# Patient Record
Sex: FEMALE | Race: BLACK OR AFRICAN AMERICAN | Employment: UNEMPLOYED | URBAN - METROPOLITAN AREA
[De-identification: names, ages, dates, MRNs, and addresses within clinical notes are randomized per-mention and may not be internally consistent; named-entity substitution may affect disease eponyms.]

---

## 2017-10-18 ENCOUNTER — OFFICE VISIT (OUTPATIENT)
Dept: INTERNAL MEDICINE CLINIC | Age: 52
End: 2017-10-18

## 2017-10-18 VITALS
RESPIRATION RATE: 20 BRPM | BODY MASS INDEX: 27.32 KG/M2 | HEIGHT: 65 IN | SYSTOLIC BLOOD PRESSURE: 147 MMHG | WEIGHT: 164 LBS | HEART RATE: 77 BPM | TEMPERATURE: 97.8 F | DIASTOLIC BLOOD PRESSURE: 96 MMHG | OXYGEN SATURATION: 100 %

## 2017-10-18 DIAGNOSIS — K21.9 GASTROESOPHAGEAL REFLUX DISEASE WITHOUT ESOPHAGITIS: ICD-10-CM

## 2017-10-18 DIAGNOSIS — Z12.31 ENCOUNTER FOR SCREENING MAMMOGRAM FOR BREAST CANCER: ICD-10-CM

## 2017-10-18 DIAGNOSIS — Z78.0 MENOPAUSE: ICD-10-CM

## 2017-10-18 DIAGNOSIS — Z13.0 SCREENING FOR DEFICIENCY ANEMIA: ICD-10-CM

## 2017-10-18 DIAGNOSIS — F32.A ANXIETY AND DEPRESSION: ICD-10-CM

## 2017-10-18 DIAGNOSIS — F41.9 ANXIETY AND DEPRESSION: ICD-10-CM

## 2017-10-18 DIAGNOSIS — Z23 ENCOUNTER FOR IMMUNIZATION: ICD-10-CM

## 2017-10-18 DIAGNOSIS — R13.10 DYSPHAGIA, UNSPECIFIED TYPE: ICD-10-CM

## 2017-10-18 DIAGNOSIS — R23.2 HOT FLASHES: ICD-10-CM

## 2017-10-18 DIAGNOSIS — F17.218 CIGARETTE NICOTINE DEPENDENCE WITH OTHER NICOTINE-INDUCED DISORDER: ICD-10-CM

## 2017-10-18 DIAGNOSIS — M65.332 TRIGGER MIDDLE FINGER OF LEFT HAND: ICD-10-CM

## 2017-10-18 DIAGNOSIS — Z13.220 SCREENING FOR HYPERLIPIDEMIA: ICD-10-CM

## 2017-10-18 DIAGNOSIS — I10 ESSENTIAL HYPERTENSION: Primary | ICD-10-CM

## 2017-10-18 RX ORDER — CITALOPRAM 20 MG/1
20 TABLET, FILM COATED ORAL DAILY
Qty: 30 TAB | Refills: 6 | Status: SHIPPED | OUTPATIENT
Start: 2017-10-18 | End: 2017-11-20 | Stop reason: SDUPTHER

## 2017-10-18 RX ORDER — AMLODIPINE BESYLATE 5 MG/1
5 TABLET ORAL DAILY
Qty: 30 TAB | Refills: 6 | Status: SHIPPED | OUTPATIENT
Start: 2017-10-18 | End: 2017-11-20 | Stop reason: SDUPTHER

## 2017-10-18 NOTE — PATIENT INSTRUCTIONS
Body Mass Index: Care Instructions  Your Care Instructions    Body mass index (BMI) can help you see if your weight is raising your risk for health problems. It uses a formula to compare how much you weigh with how tall you are. · A BMI lower than 18.5 is considered underweight. · A BMI between 18.5 and 24.9 is considered healthy. · A BMI between 25 and 29.9 is considered overweight. A BMI of 30 or higher is considered obese. If your BMI is in the normal range, it means that you have a lower risk for weight-related health problems. If your BMI is in the overweight or obese range, you may be at increased risk for weight-related health problems, such as high blood pressure, heart disease, stroke, arthritis or joint pain, and diabetes. If your BMI is in the underweight range, you may be at increased risk for health problems such as fatigue, lower protection (immunity) against illness, muscle loss, bone loss, hair loss, and hormone problems. BMI is just one measure of your risk for weight-related health problems. You may be at higher risk for health problems if you are not active, you eat an unhealthy diet, or you drink too much alcohol or use tobacco products. Follow-up care is a key part of your treatment and safety. Be sure to make and go to all appointments, and call your doctor if you are having problems. It's also a good idea to know your test results and keep a list of the medicines you take. How can you care for yourself at home? · Practice healthy eating habits. This includes eating plenty of fruits, vegetables, whole grains, lean protein, and low-fat dairy. · If your doctor recommends it, get more exercise. Walking is a good choice. Bit by bit, increase the amount you walk every day. Try for at least 30 minutes on most days of the week. · Do not smoke. Smoking can increase your risk for health problems. If you need help quitting, talk to your doctor about stop-smoking programs and medicines. These can increase your chances of quitting for good. · Limit alcohol to 2 drinks a day for men and 1 drink a day for women. Too much alcohol can cause health problems. If you have a BMI higher than 25  · Your doctor may do other tests to check your risk for weight-related health problems. This may include measuring the distance around your waist. A waist measurement of more than 40 inches in men or 35 inches in women can increase the risk of weight-related health problems. · Talk with your doctor about steps you can take to stay healthy or improve your health. You may need to make lifestyle changes to lose weight and stay healthy, such as changing your diet and getting regular exercise. If you have a BMI lower than 18.5  · Your doctor may do other tests to check your risk for health problems. · Talk with your doctor about steps you can take to stay healthy or improve your health. You may need to make lifestyle changes to gain or maintain weight and stay healthy, such as getting more healthy foods in your diet and doing exercises to build muscle. Where can you learn more? Go to http://natty-larry.info/. Enter S176 in the search box to learn more about \"Body Mass Index: Care Instructions. \"  Current as of: January 23, 2017  Content Version: 11.3  © 5402-3566 [employer]. Care instructions adapted under license by [employer] (which disclaims liability or warranty for this information). If you have questions about a medical condition or this instruction, always ask your healthcare professional. [place] [address] any warranty or liability for your use of this information. Learning About Anxiety Disorders  What are anxiety disorders? Anxiety disorders are a type of medical problem. They cause severe anxiety. When you feel anxious, you feel that something bad is about to happen. This feeling interferes with your life.   These disorders include:  · Generalized anxiety disorder. You feel worried and stressed about many everyday events and activities. This goes on for several months and disrupts your life on most days. · Panic disorder. You have repeated panic attacks. A panic attack is a sudden, intense fear or anxiety. It may make you feel short of breath. Your heart may pound. · Social anxiety disorder. You feel very anxious about what you will say or do in front of people. For example, you may be scared to talk or eat in public. This problem affects your daily life. · Phobias. You are very scared of a specific object, situation, or activity. For example, you may fear spiders, high places, or small spaces. What are the symptoms? Generalized anxiety disorder  Symptoms may include:  · Feeling worried and stressed about many things almost every day. · Feeling tired or irritable. You may have a hard time concentrating. · Having headaches or muscle aches. · Having a hard time getting to sleep or staying asleep. Panic disorder  You may have repeated panic attacks when there is no reason for feeling afraid. You may change your daily activities because you worry that you will have another attack. Symptoms may include:  · Intense fear, terror, or anxiety. · Trouble breathing or very fast breathing. · Chest pain or tightness. · A heartbeat that races or is not regular. Social anxiety disorder  Symptoms may include:  · Fear about a social situation, such as eating in front of others or speaking in public. You may worry a lot. Or you may be afraid that something bad will happen. · Anxiety that can cause you to blush, sweat, and feel shaky. · A heartbeat that is faster than normal.  · A hard time focusing. Phobias  Symptoms may include:  · More fear than most people of being around an object, being in a situation, or doing an activity. You might also be stressed about the chance of being around the thing you fear.   · Worry about losing control, panicking, fainting, or having physical symptoms like a faster heartbeat when you are around the situation or object. How are these disorders treated? Anxiety disorders can be treated with medicines or counseling. A combination of both may be used. Medicines may include:  · Antidepressants. These may help your symptoms by keeping chemicals in your brain in balance. · Benzodiazepines. These may give you short-term relief of your symptoms. Some people use cognitive-behavioral therapy. A therapist helps you learn to change stressful or bad thoughts into helpful thoughts. Lead a healthy lifestyle  A healthy lifestyle may help you feel better. · Get at least 30 minutes of exercise on most days of the week. Walking is a good choice. · Eat a healthy diet. Include fruits, vegetables, lean proteins, and whole grains in your diet each day. · Try to go to bed at the same time every night. Try for 8 hours of sleep a night. · Find ways to manage stress. Try relaxation exercises. · Avoid alcohol and illegal drugs. Follow-up care is a key part of your treatment and safety. Be sure to make and go to all appointments, and call your doctor if you are having problems. It's also a good idea to know your test results and keep a list of the medicines you take. Where can you learn more? Go to http://natty-larry.info/. Enter I026 in the search box to learn more about \"Learning About Anxiety Disorders. \"  Current as of: May 3, 2017  Content Version: 11.3  © 9632-4417 [employer], [employer]. Care instructions adapted under license by [employer] (which disclaims liability or warranty for this information). If you have questions about a medical condition or this instruction, always ask your healthcare professional. [address] any warranty or liability for your use of this information.

## 2017-10-18 NOTE — MR AVS SNAPSHOT
Visit Information Date & Time Provider Department Dept. Phone Encounter #  
 10/18/2017  3:00 PM Anjel Henao MD [facility] [address] [place] [address] [phone] [id number] Follow-up Instructions Return in about 4 weeks (around 11/15/2017) for BP check. Your Appointments 11/27/2017  2:00 PM  
Office Visit with Anjel Henao MD  
[facility] [address] [place] [address] Appt Note: 4 week f/u  
 [address] [facility], [address] [place] [address] [place]  
  
   
 [address], [address] Upcoming Health Maintenance Date Due Pneumococcal 19-64 Medium Risk (1 of 1 - PPSV23) 12/15/1986 DTaP/Tdap/Td series (1 - Tdap) 12/15/1988 PAP AKA CERVICAL CYTOLOGY 12/15/1988 INFLUENZA AGE 9 TO ADULT 8/1/2017 Allergies as of 10/18/2017  Review Complete On: 10/18/2017 By: Anjel Henao MD  
 No Known Allergies Current Immunizations  Reviewed on 10/18/2017 Name Date Influenza Vaccine (Quad) PF 10/18/2017 Reviewed by Remington Mullen LPN on 17/62/7987 at  3:55 PM  
You Were Diagnosed With   
  
 Codes Comments Essential hypertension    -  Primary ICD-10-CM: I10 
ICD-9-CM: 401.9 Screening for hyperlipidemia     ICD-10-CM: Z13.220 ICD-9-CM: V77.91 Screening for deficiency anemia     ICD-10-CM: Z13.0 ICD-9-CM: V78.1 Menopause     ICD-10-CM: Z78.0 ICD-9-CM: 627.2 Anxiety     ICD-10-CM: F41.9 ICD-9-CM: 300.00 Gastroesophageal reflux disease without esophagitis     ICD-10-CM: K21.9 ICD-9-CM: 530.81 Dysphagia, unspecified type     ICD-10-CM: R13.10 ICD-9-CM: 787.20 Hot flashes     ICD-10-CM: R23.2 ICD-9-CM: 782.62 Encounter for immunization     ICD-10-CM: G09 ICD-9-CM: V03.89 Trigger middle finger of left hand     ICD-10-CM: L19.922 ICD-9-CM: 727.03 Breast cancer screening     ICD-10-CM: Z12.31 
ICD-9-CM: V76.10 Encounter for screening mammogram for breast cancer     ICD-10-CM: Z12.31 
ICD-9-CM: V76.12 Vitals BP Pulse Temp Resp Height(growth percentile) Weight(growth percentile) (!) 147/96 77 97.8 °F (36.6 °C) 20 5' 5\" (1.651 m) 164 lb (74.4 kg) LMP SpO2 BMI OB Status Smoking Status 09/17/2015 100% 27.29 kg/m2 Postmenopausal Light Tobacco Smoker BMI and BSA Data Body Mass Index Body Surface Area  
 27.29 kg/m 2 1.85 m 2 Preferred Pharmacy Pharmacy Name Phone [address] [facility] , [address] [place] [address] [phone] Your Updated Medication List  
  
   
This list is accurate as of: 10/18/17  4:01 PM.  Always use your most recent med list. amLODIPine 5 mg tablet Commonly known as:  [place] Royals Take 1 Tab by mouth daily. citalopram 20 mg tablet Commonly known as:  Verlin Shalom Take 1 Tab by mouth daily. Prescriptions Sent to Pharmacy Refills  
 amLODIPine (NORVASC) 5 mg tablet 6 Sig: Take 1 Tab by mouth daily. Class: Normal  
 Pharmacy: [address], [facility] , [address] [place] [address] Ph #: [phone] Route: Oral  
 citalopram (CELEXA) 20 mg tablet 6 Sig: Take 1 Tab by mouth daily. Class: Normal  
 Pharmacy: [address], [facility], [address] [place] [address] Ph #: [phone] Route: Oral  
  
We Performed the Following INFLUENZA VIRUS VAC QUAD,SPLIT,PRESV FREE SYRINGE IM [id number] CPT(R)] REFERRAL TO GASTROENTEROLOGY [IPJ68 Custom] REFERRAL TO ORTHOPEDIC SURGERY [REF62 Custom] REFERRAL TO PSYCHIATRY [REF91 Custom] Comments:  
 [facility] Follow-up Instructions Return in about 4 weeks (around 11/15/2017) for BP check. To-Do List   
 10/18/2017 Lab:  CBC WITH AUTOMATED DIFF Around 10/18/2017 Lab:  HEMOGLOBIN A1C W/O EAG   
  
 10/18/2017 Lab:  LIPID PANEL   
  
 10/18/2017 Imaging:  JED MAMMO BI SCREENING INCL CAD   
  
 10/18/2017 Lab:  MICROALBUMIN, UR, RAND W/ MICROALBUMIN/CREA RATIO Around 10/18/2017 Lab:  TSH AND FREE T4 Around 12/17/2017 Lab:  METABOLIC PANEL, BASIC Referral Information Referral ID Referred By Referred To  
  
 [id number] Jelly Guzman Not Available Visits Status Start Date End Date 1 New Request 10/18/17 10/18/18 If your referral has a status of pending review or denied, additional information will be sent to support the outcome of this decision. Referral ID Referred By Referred To  
 [id number] Jelly Guzman Not Available Visits Status Start Date End Date 1 New Request 10/18/17 10/18/18 If your referral has a status of pending review or denied, additional information will be sent to support the outcome of this decision. Referral ID Referred By Referred To  
 [id number] Jelly Guzman Not Available Visits Status Start Date End Date 1 New Request 10/18/17 10/18/18 If your referral has a status of pending review or denied, additional information will be sent to support the outcome of this decision. Patient Instructions Body Mass Index: Care Instructions Your Care Instructions Body mass index (BMI) can help you see if your weight is raising your risk for health problems. It uses a formula to compare how much you weigh with how tall you are. · A BMI lower than 18.5 is considered underweight. · A BMI between 18.5 and 24.9 is considered healthy. · A BMI between 25 and 29.9 is considered overweight. A BMI of 30 or higher is considered obese. If your BMI is in the normal range, it means that you have a lower risk for weight-related health problems.  If your BMI is in the overweight or obese range, you may be at increased risk for weight-related health problems, such as high blood pressure, heart disease, stroke, arthritis or joint pain, and diabetes. If your BMI is in the underweight range, you may be at increased risk for health problems such as fatigue, lower protection (immunity) against illness, muscle loss, bone loss, hair loss, and hormone problems. BMI is just one measure of your risk for weight-related health problems. You may be at higher risk for health problems if you are not active, you eat an unhealthy diet, or you drink too much alcohol or use tobacco products. Follow-up care is a key part of your treatment and safety. Be sure to make and go to all appointments, and call your doctor if you are having problems. It's also a good idea to know your test results and keep a list of the medicines you take. How can you care for yourself at home? · Practice healthy eating habits. This includes eating plenty of fruits, vegetables, whole grains, lean protein, and low-fat dairy. · If your doctor recommends it, get more exercise. Walking is a good choice. Bit by bit, increase the amount you walk every day. Try for at least 30 minutes on most days of the week. · Do not smoke. Smoking can increase your risk for health problems. If you need help quitting, talk to your doctor about stop-smoking programs and medicines. These can increase your chances of quitting for good. · Limit alcohol to 2 drinks a day for men and 1 drink a day for women. Too much alcohol can cause health problems. If you have a BMI higher than 25 · Your doctor may do other tests to check your risk for weight-related health problems. This may include measuring the distance around your waist. A waist measurement of more than 40 inches in men or 35 inches in women can increase the risk of weight-related health problems. · Talk with your doctor about steps you can take to stay healthy or improve your health. You may need to make lifestyle changes to lose weight and stay healthy, such as changing your diet and getting regular exercise. If you have a BMI lower than 18.5 · Your doctor may do other tests to check your risk for health problems. · Talk with your doctor about steps you can take to stay healthy or improve your health. You may need to make lifestyle changes to gain or maintain weight and stay healthy, such as getting more healthy foods in your diet and doing exercises to build muscle. Where can you learn more? Go to http://natty-larry.info/. Enter S176 in the search box to learn more about \"Body Mass Index: Care Instructions. \" Current as of: January 23, 2017 Content Version: 11.3 © 2557-5177 [employer]. Care instructions adapted under license by [employer] (which disclaims liability or warranty for this information). If you have questions about a medical condition or this instruction, always ask your healthcare professional. [address] any warranty or liability for your use of this information. Learning About Anxiety Disorders What are anxiety disorders? Anxiety disorders are a type of medical problem. They cause severe anxiety. When you feel anxious, you feel that something bad is about to happen. This feeling interferes with your life. These disorders include: · Generalized anxiety disorder. You feel worried and stressed about many everyday events and activities. This goes on for several months and disrupts your life on most days. · Panic disorder. You have repeated panic attacks. A panic attack is a sudden, intense fear or anxiety. It may make you feel short of breath. Your heart may pound. · Social anxiety disorder. You feel very anxious about what you will say or do in front of people. For example, you may be scared to talk or eat in public. This problem affects your daily life. · Phobias. You are very scared of a specific object, situation, or activity. For example, you may fear spiders, high places, or small spaces. What are the symptoms? Generalized anxiety disorder Symptoms may include: · Feeling worried and stressed about many things almost every day. · Feeling tired or irritable. You may have a hard time concentrating. · Having headaches or muscle aches. · Having a hard time getting to sleep or staying asleep. Panic disorder You may have repeated panic attacks when there is no reason for feeling afraid. You may change your daily activities because you worry that you will have another attack. Symptoms may include: 
· Intense fear, terror, or anxiety. · Trouble breathing or very fast breathing. · Chest pain or tightness. · A heartbeat that races or is not regular. Social anxiety disorder Symptoms may include: · Fear about a social situation, such as eating in front of others or speaking in public. You may worry a lot. Or you may be afraid that something bad will happen. · Anxiety that can cause you to blush, sweat, and feel shaky. · A heartbeat that is faster than normal. 
· A hard time focusing. Phobias Symptoms may include: · More fear than most people of being around an object, being in a situation, or doing an activity. You might also be stressed about the chance of being around the thing you fear. · Worry about losing control, panicking, fainting, or having physical symptoms like a faster heartbeat when you are around the situation or object. How are these disorders treated? Anxiety disorders can be treated with medicines or counseling. A combination of both may be used. Medicines may include: · Antidepressants. These may help your symptoms by keeping chemicals in your brain in balance. · Benzodiazepines. These may give you short-term relief of your symptoms. Some people use cognitive-behavioral therapy. A therapist helps you learn to change stressful or bad thoughts into helpful thoughts. Lead a healthy lifestyle A healthy lifestyle may help you feel better. · Get at least 30 minutes of exercise on most days of the week. Walking is a good choice. · Eat a healthy diet. Include fruits, vegetables, lean proteins, and whole grains in your diet each day. · Try to go to bed at the same time every night. Try for 8 hours of sleep a night. · Find ways to manage stress. Try relaxation exercises. · Avoid alcohol and illegal drugs. Follow-up care is a key part of your treatment and safety. Be sure to make and go to all appointments, and call your doctor if you are having problems. It's also a good idea to know your test results and keep a list of the medicines you take. Where can you learn more? Go to http://natty-larry.info/. Enter F159 in the search box to learn more about \"Learning About Anxiety Disorders. \" Current as of: May 3, 2017 Content Version: 11.3 © 0879-5478 [employer]. Care instructions adapted under license by [employer] (which disclaims liability or warranty for this information). If you have questions about a medical condition or this instruction, always ask your healthcare professional. [place] [address] any warranty or liability for your use of this information. Introducing [facility]! Elsi Yoon introduces [employer] patient portal. Now you can access parts of your medical record, email your doctor's office, and request medication refills online. 1. In your internet browser, go to [email or web address] 2. Click on the First Time User? Click Here link in the Sign In box. You will see the New Member Sign Up page. 3. Enter your [employer] Access Code exactly as it appears below. You will not need to use this code after youve completed the sign-up process. If you do not sign up before the expiration date, you must request a new code. · [employer] Access Code: [email or web address] Expires: 1/16/2018  2:41 PM 
 
 4. Enter the last four digits of your Social Security Number (xxxx) and Date of Birth (mm/dd/yyyy) as indicated and click Submit. You will be taken to the next sign-up page. 5. Create a Resermap ID. This will be your Resermap login ID and cannot be changed, so think of one that is secure and easy to remember. 6. Create a Resermap password. You can change your password at any time. 7. Enter your Password Reset Question and Answer. This can be used at a later time if you forget your password. 8. Enter your e-mail address. You will receive e-mail notification when new information is available in [address] 9. Click Sign Up. You can now view and download portions of your medical record. 10. Click the Download Summary menu link to download a portable copy of your medical information. If you have questions, please visit the Frequently Asked Questions section of the Resermap website. Remember, Resermap is NOT to be used for urgent needs. For medical emergencies, dial 911. Now available from your iPhone and Android! Please provide this summary of care documentation to your next provider. Your primary care clinician is listed as Padmaja Cantu. If you have any questions after today's visit, please call [phone].

## 2017-10-18 NOTE — PROGRESS NOTES
Lila Gupta is a 54 y.o. female who presents for routine immunizations. She denies any symptoms , reactions or allergies that would exclude them from being immunized today. Risks and adverse reactions were discussed and the VIS was given to them. All questions were addressed. She was observed for 10 min post injection. There were no reactions observed.     Lilli Muir LPN

## 2017-10-18 NOTE — PROGRESS NOTES
INTERNISTS OF [facility]:  10/29/2017, MRN: [id number]      Caryle Canal is a 54 y.o. female and presents to clinic to Abimael Witt Real; Menopause; and Immunization/Injection (flu shot)    Subjective: The patient is a 71-year-old [place] American female with history of menopause, Mood Disorder NOS, hypertension, dysphasia, trigger finger, and stress incontinence. 1. HTN: This is a chronic condition, present for at least 6 months. The patient ran out of her medication 6 months ago. In the past, prior to running out she was taking HCTZ. She reports no adverse side effects of taking this medication. She admits that she has been distracted, taking care of her sick mom. She has not focus on her own health for quite some time. She does have a history of snoring. She is not consume energy drinks. She has 1 2 cups of coffee per day. She states she has never been on any other medication other than HCTZ for blood pressure reduction. 2.  Nicotine dependence: The patient has a history of smoking since 1998. One pack will last up to 1 week. She has never tried medication to stop smoking. Smoking helps to relax her. 3.  GERD and dysphasia: Patient has an over six-month history of GERD. She has been taking Prilosec which has worked very well in the past.  The patient has corresponding dysphasia for several months. If she eats too heavy of a meal, she will throw up typically at night. She states she has never had an endoscopy. She thinks she has lactose intolerance, throwing up after consuming any dairy products. She has chronic constipation and administers enemas on average once a month for severe symptoms. She occasionally will see some flecks of blood in her stool after enemas. She often feels full and bloated. She has no melena. She had abdominal pain is off and on and generalized. Pain is more prevalent along her epigastric area though.   She was told in the past, she may have a cyst along her left ovary. 4.  Left hand trigger finger: The patient has pain along her left hand third and fourth digits. She states that at times, such fingers will lock in place and she has to manually stretch them out. Such episodes are associated with discomfort. No alleviating factors are known. No aggravating factors are known. Symptoms have been present for at least several months. 5.  Anxiety, Depression, Fatigue, Delusional Thinking, and Menopause: The patient is a disabled . She was followed by a psychiatrist in the past.  Her previous PCP has been treating her history of anxiety and depression. She has never been hospitalized for mood disorders. Symptoms have been present since 2007. She does have a history of a suicidal attempt/ideation. Last time she felt this way was years ago. She is presently on no medication. She used to take citalopram which worked well to control symptoms. She reports no adverse side effects to taking this medication. She is asking for a refill on this medication. She does not remember the dose of citalopram she used to be on. The patient states that her symptoms of anxiety and depression stem from her history of body odor problems. Despite taking prescription strength antiperspirants, the patient reports worsening body odor. Her body odor has affected her relationships with other people. When asked how others view her body odor, the patient states that although people have never spoken to her about her body odor, she can tell with her thinking based off of her facial expressions. Furthermore, when asked if she has body odor today, she states she does not. She is using vinegar, baking soda, and lemon juice along bilateral axilla for body odor. Asked if she has any visual hallucinations, she denies them. She endorses auditory hallucinations. She states the voices tell her that she has bad body odor. She also endorses feelings of extreme fatigue.   The patient reports no history of abnormal Pap smears. She is concerned that her mood symptoms are related to menopause. She states she has not had a period at least a year. No hematuria. No vaginal bleeding. She has hot flashes. No alleviating factors are known. No aggravating factors are known. Patient Active Problem List    Diagnosis Date Noted    Gastroesophageal reflux disease without esophagitis 10/29/2017    Dysphagia 10/29/2017    Trigger middle finger of left hand 10/29/2017    Menopause 10/18/2017    Anxiety 10/18/2017    Essential hypertension 10/18/2017    Chronic vaginitis 10/30/2016    JANET (stress urinary incontinence, female) 10/30/2016           No Known Allergies    Past Medical History:   Diagnosis Date    Acid reflux     Anxiety     Depression     chroniuc anxiety'    Ectopic pregnancy     Foul smelling urine     Frequency of urination     GERD (gastroesophageal reflux disease)     HTN (hypertension)     Hypertension        Past Surgical History:   Procedure Laterality Date    HX GYN      CHGA-Removal of fallopian tube     HX OTHER SURGICAL      ectopic       Family History   Problem Relation Age of Onset    Diabetes Mother     Heart Disease Mother     Hypertension Mother     Kidney Disease Mother        Social History   Substance Use Topics    Smoking status: Light Tobacco Smoker    Smokeless tobacco: Never Used    Alcohol use No       ROS   Review of Systems   Constitutional: Positive for chills and malaise/fatigue. Negative for fever. HENT: Negative for ear pain and sore throat. Eyes: Negative for blurred vision and pain. Respiratory: Negative for cough and shortness of breath. Cardiovascular: Negative for chest pain. Gastrointestinal: Positive for abdominal pain, constipation, nausea and vomiting. Negative for melena. Genitourinary: Negative for dysuria and hematuria. Musculoskeletal: Positive for joint pain. Negative for myalgias.    Skin: Negative for rash.   Neurological: Negative for tingling, focal weakness and headaches. Endo/Heme/Allergies: Does not bruise/bleed easily. Psychiatric/Behavioral: Positive for depression and hallucinations. Negative for substance abuse and suicidal ideas. The patient is nervous/anxious. Objective     Vitals:    10/18/17 1509   BP: (!) 147/96   Pulse: 77   Resp: 20   Temp: 97.8 °F (36.6 °C)   SpO2: 100%   Weight: 164 lb (74.4 kg)   Height: 5' 5\" (1.651 m)   PainSc:   6   PainLoc: Hip   LMP: 09/17/2015       Physical Exam   Constitutional: She is oriented to person, place, and time and well-developed, well-nourished, and in no distress. HENT:   Head: Normocephalic and atraumatic. Right Ear: External ear normal.   Left Ear: External ear normal.   Nose: Nose normal.   Mouth/Throat: Oropharynx is clear and moist. No oropharyngeal exudate. Clear TMs   Eyes: Conjunctivae and EOM are normal. Pupils are equal, round, and reactive to light. Right eye exhibits no discharge. Left eye exhibits no discharge. No scleral icterus. Neck: Neck supple. No thyromegaly present. Cardiovascular: Normal rate, regular rhythm, normal heart sounds and intact distal pulses. Exam reveals no gallop and no friction rub. No murmur heard. Pulmonary/Chest: Effort normal and breath sounds normal. No respiratory distress. She has no wheezes. She has no rales. Abdominal: Soft. Bowel sounds are normal. She exhibits no distension. There is no tenderness. There is no rebound and no guarding. No hepatomegaly   Musculoskeletal: She exhibits no edema or tenderness (BUE are NTTP). Lymphadenopathy:     She has no cervical adenopathy. Neurological: She is alert and oriented to person, place, and time. She exhibits normal muscle tone. Gait normal.   Skin: Skin is warm and dry. No erythema. Psychiatric:   Slightly anxious affect   Nursing note and vitals reviewed.       LABS   Data Review:   Lab Results   Component Value Date/Time    WBC 5.2 10/18/2017 04:08 AM    HGB 12.2 10/18/2017 04:08 AM    HCT 36.3 10/18/2017 04:08 AM    PLATELET 820 88/33/0541 04:08 AM    MCV 88 10/18/2017 04:08 AM       Lab Results   Component Value Date/Time    Sodium 141 10/18/2017 04:08 AM    Potassium 4.2 10/18/2017 04:08 AM    Chloride 98 10/18/2017 04:08 AM    CO2 29 10/18/2017 04:08 AM    Glucose 99 10/18/2017 04:08 AM    BUN 16 10/18/2017 04:08 AM    Creatinine 0.76 10/18/2017 04:08 AM    BUN/Creatinine ratio 21 10/18/2017 04:08 AM    GFR est  10/18/2017 04:08 AM    GFR est non-AA 92 10/18/2017 04:08 AM    Calcium 10.7 10/18/2017 04:08 AM       Lab Results   Component Value Date/Time    Cholesterol, total 220 10/18/2017 04:08 AM    HDL Cholesterol 112 10/18/2017 04:08 AM    LDL, calculated 88 10/18/2017 04:08 AM    VLDL, calculated 20 10/18/2017 04:08 AM    Triglyceride 99 10/18/2017 04:08 AM       Lab Results   Component Value Date/Time    Hemoglobin A1c 5.6 10/18/2017 04:08 AM       Assessment/Plan:   1. Menopause and Depression/Anxiety Disorder NOS: The pt does not have any bad body odor today. Delusional thinking? Ordering citalopram.  The patient was instructed to stop this medication in the event that her symptoms worsen. Requesting records from her previous PCP. A referral was placed to the psychiatry team.  The patient was encouraged to schedule an appointment for management of her underlying mood disorder NOS. Checking TFTs. ORDERS:  - TSH AND FREE T4; Future  - REFERRAL TO PSYCHIATRY  - citalopram (CELEXA) 20 mg tablet; Take 1 Tab by mouth daily. Dispense: 30 Tab; Refill: 6    2. Essential hypertension: BP is elevated but the pt ran out of her rx. Must r/o nephropathy. Checking a lipid panel, BMP, TFTs, A1c, and a urine protein screen. Ordering amlodipine 5 mg daily. May consider getting a sleep study to rule out underlying OVIDIO in the future    ORDERS:  - LIPID PANEL;  Future  - METABOLIC PANEL, BASIC; Future  - TSH AND FREE T4; Future  - MICROALBUMIN, UR, RAND W/ MICROALBUMIN/CREA RATIO; Future  - amLODIPine (NORVASC) 5 mg tablet; Take 1 Tab by mouth daily. Dispense: 30 Tab; Refill: 6  - HEMOGLOBIN A1C W/O EAG; Future    3. Gastroesophageal reflux disease without esophagitis and N/V:   Placing a referral to gastroenterology team.  Can take an over-the-counter PPI. ORDERS:  - REFERRAL TO GASTROENTEROLOGY    4. Health Maintenance:  The flu vaccine was administered today. Ordering a mammogram to screen for breast cancer  Ordering a CBC to screen for anemia. Ordering a lipid panel to screen for hyperlipidemia. I requesting her last mammogram, Pap smear, vaccination record, and PCP records. ORDERS:  - LIPID PANEL; Future  - Influenza virus vaccine (QUADRIVALENT PRES FREE SYRINGE) IM (18038)  - JED MAMMO BI SCREENING INCL CAD; Future  - CBC WITH AUTOMATED DIFF; Future    5. Trigger middle finger of left hand  Placing a referral to orthopedic surgery    ORDERS:  - REFERRAL TO ORTHOPEDIC SURGERY    6.  Nicotine dependence:  I encouraged patient to stop smoking. We briefly discussed the potential benefits of smoking cessation. Health Maintenance Due   Topic Date Due    Pneumococcal 19-64 Medium Risk (1 of 1 - PPSV23) 12/15/1986    DTaP/Tdap/Td series (1 - Tdap) 12/15/1988    PAP AKA CERVICAL CYTOLOGY  12/15/1988         Lab review: orders written for new lab studies as appropriate; see orders    I have discussed the diagnosis with the patient and the intended plan as seen in the above orders. The patient has received an after-visit summary and questions were answered concerning future plans. I have discussed medication side effects and warnings with the patient as well. I have reviewed the plan of care with the patient, accepted their input and they are in agreement with the treatment goals. All questions were answered. The patient understands the plan of care. Handouts provided today with above information.  Pt instructed if symptoms worsen to call the office or report to the ED for continued care. Greater than 50% of the visit time was spent in counseling and/or coordination of care. Follow-up Disposition:  Return in about 4 weeks (around 11/15/2017) for BP check.     Leti Rosario MD

## 2017-10-19 LAB
ALBUMIN/CREAT UR: ABNORMAL MG/G CREAT (ref 0–30)
BASOPHILS # BLD AUTO: 0 X10E3/UL (ref 0–0.2)
BASOPHILS NFR BLD AUTO: 0 %
BUN SERPL-MCNC: 16 MG/DL (ref 6–24)
BUN/CREAT SERPL: 21 (ref 9–23)
CALCIUM SERPL-MCNC: 10.7 MG/DL (ref 8.7–10.2)
CHLORIDE SERPL-SCNC: 98 MMOL/L (ref 96–106)
CHOLEST SERPL-MCNC: 220 MG/DL (ref 100–199)
CO2 SERPL-SCNC: 29 MMOL/L (ref 18–29)
CREAT SERPL-MCNC: 0.76 MG/DL (ref 0.57–1)
CREAT UR-MCNC: 58.3 MG/DL
EOSINOPHIL # BLD AUTO: 0.1 X10E3/UL (ref 0–0.4)
EOSINOPHIL NFR BLD AUTO: 2 %
ERYTHROCYTE [DISTWIDTH] IN BLOOD BY AUTOMATED COUNT: 13.5 % (ref 12.3–15.4)
GFR SERPLBLD CREATININE-BSD FMLA CKD-EPI: 107 ML/MIN/1.73
GFR SERPLBLD CREATININE-BSD FMLA CKD-EPI: 92 ML/MIN/1.73
GLUCOSE SERPL-MCNC: 99 MG/DL (ref 65–99)
HBA1C MFR BLD: 5.6 % (ref 4.8–5.6)
HCT VFR BLD AUTO: 36.3 % (ref 34–46.6)
HDLC SERPL-MCNC: 112 MG/DL
HGB BLD-MCNC: 12.2 G/DL (ref 11.1–15.9)
IMM GRANULOCYTES # BLD: 0 X10E3/UL (ref 0–0.1)
IMM GRANULOCYTES NFR BLD: 0 %
INTERPRETATION, 910389: NORMAL
LDLC SERPL CALC-MCNC: 88 MG/DL (ref 0–99)
LYMPHOCYTES # BLD AUTO: 2.4 X10E3/UL (ref 0.7–3.1)
LYMPHOCYTES NFR BLD AUTO: 46 %
MCH RBC QN AUTO: 29.4 PG (ref 26.6–33)
MCHC RBC AUTO-ENTMCNC: 33.6 G/DL (ref 31.5–35.7)
MCV RBC AUTO: 88 FL (ref 79–97)
MICROALBUMIN UR-MCNC: <3 UG/ML
MONOCYTES # BLD AUTO: 0.3 X10E3/UL (ref 0.1–0.9)
MONOCYTES NFR BLD AUTO: 6 %
NEUTROPHILS # BLD AUTO: 2.4 X10E3/UL (ref 1.4–7)
NEUTROPHILS NFR BLD AUTO: 46 %
PLATELET # BLD AUTO: 263 X10E3/UL (ref 150–379)
POTASSIUM SERPL-SCNC: 4.2 MMOL/L (ref 3.5–5.2)
RBC # BLD AUTO: 4.15 X10E6/UL (ref 3.77–5.28)
SODIUM SERPL-SCNC: 141 MMOL/L (ref 134–144)
T4 FREE SERPL-MCNC: 0.83 NG/DL (ref 0.82–1.77)
TRIGL SERPL-MCNC: 99 MG/DL (ref 0–149)
TSH SERPL DL<=0.005 MIU/L-ACNC: 0.32 UIU/ML (ref 0.45–4.5)
VLDLC SERPL CALC-MCNC: 20 MG/DL (ref 5–40)
WBC # BLD AUTO: 5.2 X10E3/UL (ref 3.4–10.8)

## 2017-10-20 ENCOUNTER — TELEPHONE (OUTPATIENT)
Dept: INTERNAL MEDICINE CLINIC | Age: 52
End: 2017-10-20

## 2017-10-20 NOTE — TELEPHONE ENCOUNTER
Message left for patient that she needs to change her pcp with [facility] insurance before I can do any referrals for her

## 2017-10-25 ENCOUNTER — TELEPHONE (OUTPATIENT)
Dept: INTERNAL MEDICINE CLINIC | Age: 52
End: 2017-10-25

## 2017-10-25 DIAGNOSIS — R79.89 ABNORMAL TSH: ICD-10-CM

## 2017-10-25 DIAGNOSIS — E23.0 HYPOPITUITARISM (HCC): Primary | ICD-10-CM

## 2017-10-25 DIAGNOSIS — R79.89 ABNORMAL TSH: Primary | ICD-10-CM

## 2017-10-25 NOTE — TELEPHONE ENCOUNTER
I was unsuccessful with reaching the pt via phone. Her fT4 is low normal but her TSH is very low. I am concerned that she is not producing enough TSH - produced by her brain and as a result is barely making enough thyroid hormone. I am ordering a head MRI to evaluate her pituitary gland and placing a referral to the Endocrinology team for assistance. I left a message to phone us back to discuss the lab results, referral, and brain MRI.     Dr. Cochran Likes  [facility] of [address]  Phone: [phone]  Fax: [phone]

## 2017-10-25 NOTE — PROGRESS NOTES
Her 10 year CVD risk is 3.1%. Statin rx is thus not warranted at this time. There is no evidence of diabetes. CBC results are normal. Kidney function is normal. (Please see telephone encounter note regarding her TFTs and the plan for further evaluation).     Dr. Lexx Marie  [facility] of [address]  Phone: [phone]  Fax: [phone]

## 2017-10-26 NOTE — TELEPHONE ENCOUNTER
Spoke with patient, given results message below and verbalized understanding, she will get MRI scheduled. Dr. Man Sewell, pt asked about her UA results, stated that her urine has a smell and she had talked to you about that and you were getting a UA with her labs, but I only see a Microalbumin Ur completed. Pt is also stating that she really needs a prescription strength antiperspirant and that she has tried everything otc. Her mother passed away on 10/20/17 as well, she had been caring for her. Pt is requesting that you please call her back today and let her know what to do regarding her urinary symptoms and also about the antiperspirant, she wants to speak with you directly. Her number is [phone].

## 2017-10-27 ENCOUNTER — TELEPHONE (OUTPATIENT)
Dept: INTERNAL MEDICINE CLINIC | Age: 52
End: 2017-10-27

## 2017-10-27 RX ORDER — LORAZEPAM 0.5 MG/1
TABLET ORAL
Qty: 3 TAB | Refills: 0 | Status: SHIPPED | OUTPATIENT
Start: 2017-10-27 | End: 2017-11-09 | Stop reason: ALTCHOICE

## 2017-10-27 NOTE — TELEPHONE ENCOUNTER
I answered all of her questions via phone today. She lost her mother. I offered my condolences on her loss. She is not yet scheduled with Psychiatry and Endocrinology. I encouraged her to schedule these apts. I also encouraged the pt to get her MRI (brain). She has anxiety about this. Ativan was ordered just for the MRI study. Pt states she will pick this rx up today.          Dr. Mason Mosquera  [facility], [address] [place] [address] Str.  Phone: [phone]  Fax: [phone]

## 2017-10-29 PROBLEM — R13.10 DYSPHAGIA: Status: ACTIVE | Noted: 2017-10-29

## 2017-10-29 PROBLEM — F32.A ANXIETY AND DEPRESSION: Status: ACTIVE | Noted: 2017-10-18

## 2017-10-29 PROBLEM — M65.332 TRIGGER MIDDLE FINGER OF LEFT HAND: Status: ACTIVE | Noted: 2017-10-29

## 2017-10-29 PROBLEM — F17.200 NICOTINE DEPENDENCE: Status: ACTIVE | Noted: 2017-10-29

## 2017-10-29 PROBLEM — K21.9 GASTROESOPHAGEAL REFLUX DISEASE WITHOUT ESOPHAGITIS: Status: ACTIVE | Noted: 2017-10-29

## 2017-11-01 ENCOUNTER — TELEPHONE (OUTPATIENT)
Dept: INTERNAL MEDICINE CLINIC | Age: 52
End: 2017-11-01

## 2017-11-01 ENCOUNTER — HOSPITAL ENCOUNTER (OUTPATIENT)
Dept: MAMMOGRAPHY | Age: 52
Discharge: HOME | End: 2017-11-01
Attending: INTERNAL MEDICINE
Payer: MEDICARE

## 2017-11-01 DIAGNOSIS — Z12.31 ENCOUNTER FOR SCREENING MAMMOGRAM FOR BREAST CANCER: ICD-10-CM

## 2017-11-01 PROCEDURE — 77063 BREAST TOMOSYNTHESIS BI: CPT

## 2017-11-08 ENCOUNTER — TELEPHONE (OUTPATIENT)
Dept: INTERNAL MEDICINE CLINIC | Age: 52
End: 2017-11-08

## 2017-11-08 DIAGNOSIS — R92.8 ABNORMALITY OF LEFT BREAST ON SCREENING MAMMOGRAM: Primary | ICD-10-CM

## 2017-11-08 NOTE — TELEPHONE ENCOUNTER
Please let the pt know that her mammogram shows an asymmetric area along her left breast. The Radiologist is requesting more pictures with another mammogram study with ultrasound technology in order to determine if the area is suspicious or not for breast cancer. I thus ordered this recommended study. She should schedule this study.      Dr. Lele Aragon  [facility] of [address]  Phone: [phone]  Fax: [phone]

## 2017-11-09 ENCOUNTER — OFFICE VISIT (OUTPATIENT)
Dept: INTERNAL MEDICINE CLINIC | Age: 52
End: 2017-11-09

## 2017-11-09 VITALS
DIASTOLIC BLOOD PRESSURE: 88 MMHG | WEIGHT: 166.6 LBS | HEIGHT: 65 IN | TEMPERATURE: 97.7 F | HEART RATE: 68 BPM | OXYGEN SATURATION: 98 % | BODY MASS INDEX: 27.76 KG/M2 | RESPIRATION RATE: 16 BRPM | SYSTOLIC BLOOD PRESSURE: 123 MMHG

## 2017-11-09 DIAGNOSIS — F41.9 ANXIETY AND DEPRESSION: Primary | ICD-10-CM

## 2017-11-09 DIAGNOSIS — R79.89 ABNORMAL TSH: ICD-10-CM

## 2017-11-09 DIAGNOSIS — I10 ESSENTIAL HYPERTENSION: ICD-10-CM

## 2017-11-09 DIAGNOSIS — F32.A ANXIETY AND DEPRESSION: Primary | ICD-10-CM

## 2017-11-09 DIAGNOSIS — R82.90 FOUL SMELLING URINE: ICD-10-CM

## 2017-11-09 DIAGNOSIS — E83.52 HYPERCALCEMIA: ICD-10-CM

## 2017-11-09 DIAGNOSIS — F17.218 CIGARETTE NICOTINE DEPENDENCE WITH OTHER NICOTINE-INDUCED DISORDER: ICD-10-CM

## 2017-11-09 RX ORDER — AZITHROMYCIN 500 MG/1
TABLET, FILM COATED ORAL
COMMUNITY
Start: 2017-08-05 | End: 2017-11-09 | Stop reason: ALTCHOICE

## 2017-11-09 RX ORDER — METRONIDAZOLE 500 MG/1
TABLET ORAL
COMMUNITY
Start: 2017-09-18 | End: 2017-11-09 | Stop reason: ALTCHOICE

## 2017-11-09 RX ORDER — CIPROFLOXACIN 500 MG/1
TABLET ORAL
COMMUNITY
Start: 2017-08-05 | End: 2017-11-09 | Stop reason: ALTCHOICE

## 2017-11-09 NOTE — PATIENT INSTRUCTIONS
Body Mass Index: Care Instructions  Your Care Instructions    Body mass index (BMI) can help you see if your weight is raising your risk for health problems. It uses a formula to compare how much you weigh with how tall you are. · A BMI lower than 18.5 is considered underweight. · A BMI between 18.5 and 24.9 is considered healthy. · A BMI between 25 and 29.9 is considered overweight. A BMI of 30 or higher is considered obese. If your BMI is in the normal range, it means that you have a lower risk for weight-related health problems. If your BMI is in the overweight or obese range, you may be at increased risk for weight-related health problems, such as high blood pressure, heart disease, stroke, arthritis or joint pain, and diabetes. If your BMI is in the underweight range, you may be at increased risk for health problems such as fatigue, lower protection (immunity) against illness, muscle loss, bone loss, hair loss, and hormone problems. BMI is just one measure of your risk for weight-related health problems. You may be at higher risk for health problems if you are not active, you eat an unhealthy diet, or you drink too much alcohol or use tobacco products. Follow-up care is a key part of your treatment and safety. Be sure to make and go to all appointments, and call your doctor if you are having problems. It's also a good idea to know your test results and keep a list of the medicines you take. How can you care for yourself at home? · Practice healthy eating habits. This includes eating plenty of fruits, vegetables, whole grains, lean protein, and low-fat dairy. · If your doctor recommends it, get more exercise. Walking is a good choice. Bit by bit, increase the amount you walk every day. Try for at least 30 minutes on most days of the week. · Do not smoke. Smoking can increase your risk for health problems. If you need help quitting, talk to your doctor about stop-smoking programs and medicines. These can increase your chances of quitting for good. · Limit alcohol to 2 drinks a day for men and 1 drink a day for women. Too much alcohol can cause health problems. If you have a BMI higher than 25  · Your doctor may do other tests to check your risk for weight-related health problems. This may include measuring the distance around your waist. A waist measurement of more than 40 inches in men or 35 inches in women can increase the risk of weight-related health problems. · Talk with your doctor about steps you can take to stay healthy or improve your health. You may need to make lifestyle changes to lose weight and stay healthy, such as changing your diet and getting regular exercise. If you have a BMI lower than 18.5  · Your doctor may do other tests to check your risk for health problems. · Talk with your doctor about steps you can take to stay healthy or improve your health. You may need to make lifestyle changes to gain or maintain weight and stay healthy, such as getting more healthy foods in your diet and doing exercises to build muscle. Where can you learn more? Go to http://natty-larry.info/. Enter S176 in the search box to learn more about \"Body Mass Index: Care Instructions. \"  Current as of: October 13, 2016  Content Version: 11.4  © 4862-3780 [employer]. Care instructions adapted under license by [employer] (which disclaims liability or warranty for this information). If you have questions about a medical condition or this instruction, always ask your healthcare professional. [place] [address] any warranty or liability for your use of this information.     Patient refused a copy of the Advanced Directive form and understands to bring it in once completed  Health Maintenance Due   Topic Date Due    Pneumococcal 19-64 Medium Risk (1 of 1 - PPSV23) 12/15/1986    DTaP/Tdap/Td series (1 - Tdap) 12/15/1988    PAP AKA CERVICAL CYTOLOGY 12/15/1988

## 2017-11-09 NOTE — MR AVS SNAPSHOT
Visit Information Date & Time Provider Department Dept. Phone Encounter #  
 11/9/2017  3:00 PM Kait Painter MD [facility] [address] [place] [address] [phone] [id number] Your Appointments 11/14/2017  8:30 AM  
New Patient with Millicent Jackson MD  
[facility], [address] and [facility] ([facility]) Appt Note: Trigger middle finger of left hand/ REF BY TREMAINE LITTLEJOHN/ *ADVISED PT TO COME EARLY W. PHOTO ID & INS. CARD, CURRENT MEDICATION LIST & DOSAGE [facility] [place] [address], [address] 200 [facility]  
[phone] [address] [place] [address] 98, [address]  
  
    
 11/27/2017  2:00 PM  
Office Visit with Kait Painter MD  
[facility] of [address] [place] [address]) Appt Note: 4 week f/u  
 [address] [facility], [address] [facility] [address]  
  
   
 [address], [address] Upcoming Health Maintenance Date Due Pneumococcal 19-64 Medium Risk (1 of 1 - PPSV23) 12/15/1986 DTaP/Tdap/Td series (1 - Tdap) 12/15/1988 PAP AKA CERVICAL CYTOLOGY 12/15/1988 Allergies as of 11/9/2017  Review Complete On: 11/9/2017 By: Dav Care No Known Allergies Current Immunizations  Reviewed on 10/18/2017 Name Date Influenza Vaccine (Quad) PF 10/18/2017 Not reviewed this visit You Were Diagnosed With   
  
 Codes Comments Cigarette nicotine dependence with other nicotine-induced disorder    -  Primary ICD-10-CM: F17.218 ICD-9-CM: 292.89 Anxiety and depression     ICD-10-CM: F41.8 ICD-9-CM: 300.00, 311 Essential hypertension     ICD-10-CM: I10 
ICD-9-CM: 401.9 Abnormal TSH     ICD-10-CM: R94.6 ICD-9-CM: 790.6 Hypercalcemia     ICD-10-CM: N23.43 
ICD-9-CM: 275.42 Vitals BP Pulse Temp Resp Height(growth percentile) Weight(growth percentile) 123/88 (BP 1 Location: Left arm, BP Patient Position: Sitting) 68 97.7 °F (36.5 °C) (Oral) 16 5' 5\" (1.651 m) 166 lb 9.6 oz (75.6 kg) LMP SpO2 BMI OB Status Smoking Status 09/17/2015 98% 27.72 kg/m2 Postmenopausal Light Tobacco Smoker BMI and BSA Data Body Mass Index Body Surface Area  
 27.72 kg/m 2 1.86 m 2 Preferred Pharmacy Pharmacy Name Phone [address] [facility], [address] [place] [address] Rd [phone] Your Updated Medication List  
  
   
This list is accurate as of: 11/9/17  3:54 PM.  Always use your most recent med list. amLODIPine 5 mg tablet Commonly known as:  [place] Take 1 Tab by mouth daily. citalopram 20 mg tablet Commonly known as:  Verlin Shalom Take 1 Tab by mouth daily. To-Do List   
 11/13/2017 1:30 PM  
  Appointment with [facility] 1 at [address] [place] [address] ([phone]) OUTSIDE FILMS  - Any outside films related to the study being scheduled should be brought with you on the day of the exam.  If this cannot be done there may be a delay in the reading of the study. MEDICATIONS  - Patient must bring a complete list of all medications currently taking to include prescriptions, over-the-counter meds, herbals, vitamins & any dietary supplements  GENERAL INSTRUCTIONS  - On the day of your exam do not use any bath powder, deodorant or lotions on the armpit area. 11/13/2017 2:30 PM  
  Appointment with [facility] 1 at [address] [place] [address] ([phone]) OUTSIDE FILMS  - Any outside films related to the study being scheduled should be brought with you on the day of the exam.  If this cannot be done there may be a delay in the reading of the study. MEDICATIONS  - Patient must bring a complete list of all medications currently taking to include prescriptions, over-the-counter meds, herbals, vitamins & any dietary supplements Patient Instructions Body Mass Index: Care Instructions Your Care Instructions Body mass index (BMI) can help you see if your weight is raising your risk for health problems. It uses a formula to compare how much you weigh with how tall you are. · A BMI lower than 18.5 is considered underweight. · A BMI between 18.5 and 24.9 is considered healthy. · A BMI between 25 and 29.9 is considered overweight. A BMI of 30 or higher is considered obese. If your BMI is in the normal range, it means that you have a lower risk for weight-related health problems. If your BMI is in the overweight or obese range, you may be at increased risk for weight-related health problems, such as high blood pressure, heart disease, stroke, arthritis or joint pain, and diabetes. If your BMI is in the underweight range, you may be at increased risk for health problems such as fatigue, lower protection (immunity) against illness, muscle loss, bone loss, hair loss, and hormone problems. BMI is just one measure of your risk for weight-related health problems. You may be at higher risk for health problems if you are not active, you eat an unhealthy diet, or you drink too much alcohol or use tobacco products. Follow-up care is a key part of your treatment and safety. Be sure to make and go to all appointments, and call your doctor if you are having problems. It's also a good idea to know your test results and keep a list of the medicines you take. How can you care for yourself at home? · Practice healthy eating habits. This includes eating plenty of fruits, vegetables, whole grains, lean protein, and low-fat dairy. · If your doctor recommends it, get more exercise. Walking is a good choice. Bit by bit, increase the amount you walk every day. Try for at least 30 minutes on most days of the week. · Do not smoke. Smoking can increase your risk for health problems.  If you need help quitting, talk to your doctor about stop-smoking programs and medicines. These can increase your chances of quitting for good. · Limit alcohol to 2 drinks a day for men and 1 drink a day for women. Too much alcohol can cause health problems. If you have a BMI higher than 25 · Your doctor may do other tests to check your risk for weight-related health problems. This may include measuring the distance around your waist. A waist measurement of more than 40 inches in men or 35 inches in women can increase the risk of weight-related health problems. · Talk with your doctor about steps you can take to stay healthy or improve your health. You may need to make lifestyle changes to lose weight and stay healthy, such as changing your diet and getting regular exercise. If you have a BMI lower than 18.5 · Your doctor may do other tests to check your risk for health problems. · Talk with your doctor about steps you can take to stay healthy or improve your health. You may need to make lifestyle changes to gain or maintain weight and stay healthy, such as getting more healthy foods in your diet and doing exercises to build muscle. Where can you learn more? Go to http://natty-larry.info/. Enter S176 in the search box to learn more about \"Body Mass Index: Care Instructions. \" Current as of: October 13, 2016 Content Version: 11.4 © 9662-0851 Healthwise, Incorporated. Care instructions adapted under license by [employer] (which disclaims liability or warranty for this information). If you have questions about a medical condition or this instruction, always ask your healthcare professional. [place] [address] any warranty or liability for your use of this information. Patient refused a copy of the Advanced Directive form and understands to bring it in once completed Health Maintenance Due Topic Date Due  
  Pneumococcal 19-64 Medium Risk (1 of 1 - PPSV23) 12/15/1986  
 DTaP/Tdap/Td series (1 - Tdap) 12/15/1988  PAP AKA CERVICAL CYTOLOGY  12/15/1988 Introducing [facility]! Shira Gerber introduces [employer] patient portal. Now you can access parts of your medical record, email your doctor's office, and request medication refills online. 1. In your internet browser, go to [email or web address]. [email or web address] 2. Click on the First Time User? Click Here link in the Sign In box. You will see the New Member Sign Up page. 3. Enter your Gl[employer] Access Code exactly as it appears below. You will not need to use this code after youve completed the sign-up process. If you do not sign up before the expiration date, you must request a new code. · [employer] Access Code: [email or web address] Expires: 1/16/2018  1:41 PM 
 
4. Enter the last four digits of your Social Security Number (xxxx) and Date of Birth (mm/dd/yyyy) as indicated and click Submit. You will be taken to the next sign-up page. 5. Create a GlideTV ID. This will be your Gl[employer]TV login ID and cannot be changed, so think of one that is secure and easy to remember. 6. Create a Gl[employer] password. You can change your password at any time. 7. Enter your Password Reset Question and Answer. This can be used at a later time if you forget your password. 8. Enter your e-mail address. You will receive e-mail notification when new information is available in [address] 9. Click Sign Up. You can now view and download portions of your medical record. 10. Click the Download Summary menu link to download a portable copy of your medical information. If you have questions, please visit the Frequently Asked Questions section of the [employer] website. Remember, [employer] is NOT to be used for urgent needs. For medical emergencies, dial 911. Now available from your iPhone and Android! Please provide this summary of care documentation to your next provider. Your primary care clinician is listed as Aston Duarte. If you have any questions after today's visit, please call [phone].

## 2017-11-09 NOTE — PROGRESS NOTES
Chief Complaint   Patient presents with    Urinary Odor     x 3 weeks     Patient refused a copy of the Advanced Directive form and understands to bring it in once completed. 1. Have you been to the ER, urgent care clinic since your last visit? Hospitalized since your last visit? No    2. Have you seen or consulted any other health care providers outside of the [address] [place] [address] since your last visit? Include any pap smears or colon screening.  No

## 2017-11-09 NOTE — PROGRESS NOTES
[facility]ISTS [facility]:  11/12/2017, MRN: [id number]      Kareem Rivera is a 54 y.o. female and presents to clinic for Urinary Odor (x 3 weeks)    Subjective: The patient is a 53[facility] with history of menopause, Mood Disorder NOS, hypertension, dysphasia, trigger finger, abnormal TSH, smoking, hypercalcemia, and stress incontinence. 1.  Hypertension: This is a chronic condition, present for at least 6 months. At her last appointment, her blood pressure was elevated after running out of her medicines for at least 6 months. Amlodipine was ordered. Her blood pressure today is 123/88. No history of snoring. She does not consume energy drinks. She drinks no more than 1-2 cups of coffee per day. She was on HCTZ in the past.  Her most recent lab work shows no evidence of chronic kidney disease. On her most recent BMP, her calcium was mildly elevated. 2. Urinary Odor: Present x 3 weeks. She has no abdominal pain, fever, chills, dysuria, hematuria, or vaginal discharge. She states that the foul urinary odor comes from her left vulva. She declines a pelvic exam today stating that she has a urinary tract infection and no vaginal issues. She denies vaginal pain. 3.  Nicotine dependence: The patient has been smoking since 1998. She stated her last appointment that one pack will last 1 week. She has not tried medication in the past to stop smoking. She smokes as a means to relax her. She is still smoking. She has not cut down since her last appointment. She denies shortness of breath and cough. 4.  Anxiety, depression, and delusional thinking: The patient is a disabled  and was followed by a psychiatrist in the past.  At her last appointment, the patient admitted that her previous PCP treat her anxiety depression symptoms. She admitted that she had never been hospitalized for depression or anxiety. She states that symptoms have been present since 2007.   In the past, she has history of suicidal ideation and attempt. It has been years since she felt this way. At her last appointment, her citalopram was refilled. In the past, the patient has reported worsening body odor that affects the relationships she has with other people. Although others have not been able to confirm her body odor, the patient is adamant that it exists. When asked about hallucinations, the patient endorses auditory hallucinations at her last appointment. Voices at that time told her that she had bad body odor. At her last appointment, she was referred to psychiatry. The patient continues to hear voices despite restarting her citalopram.  She has yet to schedule an appointment with the psychiatry team.    5.  Hypopituitary syndrome: A head MRI was ordered due to suppressed TSH on recent PFTs. The patient was also referred to the endocrinology team for evaluation given her most recent lab results. She is yet to schedule an appointment with the endocrinology team.  She is not getting the head MRI due to her fear of getting the study. Note that I prescribed a benzodiazepine to be used as needed for situational anxiety when getting her head MRI. Patient Active Problem List    Diagnosis Date Noted    Abnormal TSH 11/12/2017    Gastroesophageal reflux disease without esophagitis 10/29/2017    Dysphagia 10/29/2017    Trigger middle finger of left hand 10/29/2017    Nicotine dependence 10/29/2017    Menopause 10/18/2017    Anxiety and depression 10/18/2017    Essential hypertension 10/18/2017    Chronic vaginitis 10/30/2016    JANET (stress urinary incontinence, female) 10/30/2016       Current Outpatient Prescriptions   Medication Sig Dispense Refill    amLODIPine (NORVASC) 5 mg tablet Take 1 Tab by mouth daily. 30 Tab 6    citalopram (CELEXA) 20 mg tablet Take 1 Tab by mouth daily.  30 Tab 6       No Known Allergies    Past Medical History:   Diagnosis Date    Acid reflux     Anxiety  Depression     chroniuc anxiety'    Ectopic pregnancy     Foul smelling urine     Frequency of urination     GERD (gastroesophageal reflux disease)     HTN (hypertension)     Hypertension        Past Surgical History:   Procedure Laterality Date    HX GYN      CHGA-Removal of fallopian tube     HX OTHER SURGICAL      ectopic       Family History   Problem Relation Age of Onset    Diabetes Mother     Heart Disease Mother     Hypertension Mother     Kidney Disease Mother        Social History   Substance Use Topics    Smoking status: Light Tobacco Smoker    Smokeless tobacco: Never Used    Alcohol use Yes      Comment: rare       ROS   Review of Systems   Constitutional: Negative for chills and fever. HENT: Negative for ear pain and sore throat. Eyes: Negative for blurred vision and pain. Respiratory: Negative for cough and shortness of breath. Cardiovascular: Negative for chest pain. Gastrointestinal: Negative for abdominal pain, blood in stool and melena. Genitourinary: Negative for dysuria, frequency and hematuria. Musculoskeletal: Negative for joint pain and myalgias. Skin: Negative for rash. Neurological: Negative for tingling, focal weakness and headaches. Endo/Heme/Allergies: Does not bruise/bleed easily. Psychiatric/Behavioral: Positive for hallucinations. Negative for substance abuse and suicidal ideas. The patient is nervous/anxious. Objective     Vitals:    11/09/17 1514   BP: 123/88   Pulse: 68   Resp: 16   Temp: 97.7 °F (36.5 °C)   TempSrc: Oral   SpO2: 98%   Weight: 166 lb 9.6 oz (75.6 kg)   Height: 5' 5\" (1.651 m)   PainSc:   0 - No pain   LMP: 09/17/2015       Physical Exam   Constitutional: She is oriented to person, place, and time and well-developed, well-nourished, and in no distress. HENT:   Head: Normocephalic and atraumatic.    Right Ear: External ear normal.   Left Ear: External ear normal.   Nose: Nose normal.   Mouth/Throat: Oropharynx is clear and moist. No oropharyngeal exudate. Clear TMs   Eyes: Conjunctivae and EOM are normal. Pupils are equal, round, and reactive to light. Right eye exhibits no discharge. Left eye exhibits no discharge. No scleral icterus. Neck: Neck supple. Cardiovascular: Normal rate, regular rhythm, normal heart sounds and intact distal pulses. Exam reveals no gallop and no friction rub. No murmur heard. Pulmonary/Chest: Effort normal and breath sounds normal. No respiratory distress. She has no wheezes. She has no rales. Abdominal: Soft. Bowel sounds are normal. She exhibits no distension. There is no tenderness. There is no rebound and no guarding. Musculoskeletal: She exhibits no edema or tenderness (BUE are NTTP). Lymphadenopathy:     She has no cervical adenopathy. Neurological: She is alert and oriented to person, place, and time. She exhibits normal muscle tone. Gait normal.   Skin: Skin is warm and dry. No erythema. Psychiatric:   +Anxious affect. +Delusional thinking regarding her foul smelling urine and body odor. There is no foul body odor during her visit today yet the pt continued to insist that it was present. Nursing note and vitals reviewed.       LABS   Data Review:   Lab Results   Component Value Date/Time    WBC 5.2 10/18/2017 04:08 AM    HGB 12.2 10/18/2017 04:08 AM    HCT 36.3 10/18/2017 04:08 AM    PLATELET 255 88/30/5282 04:08 AM    MCV 88 10/18/2017 04:08 AM       Lab Results   Component Value Date/Time    Sodium 141 10/18/2017 04:08 AM    Potassium 4.2 10/18/2017 04:08 AM    Chloride 98 10/18/2017 04:08 AM    CO2 29 10/18/2017 04:08 AM    Glucose 99 10/18/2017 04:08 AM    BUN 16 10/18/2017 04:08 AM    Creatinine 0.76 10/18/2017 04:08 AM    BUN/Creatinine ratio 21 10/18/2017 04:08 AM    GFR est  10/18/2017 04:08 AM    GFR est non-AA 92 10/18/2017 04:08 AM    Calcium 10.7 10/18/2017 04:08 AM       Lab Results   Component Value Date/Time    Cholesterol, total 220 10/18/2017 04:08 AM    HDL Cholesterol 112 10/18/2017 04:08 AM    LDL, calculated 88 10/18/2017 04:08 AM    VLDL, calculated 20 10/18/2017 04:08 AM    Triglyceride 99 10/18/2017 04:08 AM       Lab Results   Component Value Date/Time    Hemoglobin A1c 5.6 10/18/2017 04:08 AM       Assessment/Plan:   1. Cigarette nicotine dependence:   -I encouraged the patient to stop smoking. I encouraged her to replace his behavior with a healthier alternative behavior. 2. Anxiety, depression, and abnormal TFTs: On citalopram but still hearing voices. +H/o recent abnormal TFTs. +MRI of her head ordered.   -I will recheck her TFTs. -A head MRI was ordered and I encouraged her to get this. She continues benzodiazepine therapy as prescribed for situational anxiety.  -I encouraged the patient to follow-up with the endocrinology team.    ORDERS:  - TSH AND FREE T4; Future  - METABOLIC PANEL, BASIC; Future    3. Essential hypertension: BP is stable. +Recent mild hypercalcemia on her recent labs. -Checking TFTs, a BMP, a vitamin D level, and PTH. ORDERS:  - TSH AND FREE T4; Future  - METABOLIC PANEL, BASIC; Future  - VITAMIN D, 25 HYDROXY; Future  - PTH INTACT; Future    4. Foul urinary odor: Delusional behavior from #2 likely.  -Her physical exam findings are benign. She declines a pelvic exam.  The patient does not have any urinary symptoms, abdominal symptoms, or fever or chills. No vaginal pain or discharge. We reviewed how culturing her urine is not warranted at this time. Despite showing her recent guidelines that corroborate this decision, the patient admits to persistent anxiety. I discussed the importance of following up with the psychiatry team given persistent voices that she hears telling her that she has a bad body odor.       Health Maintenance Due   Topic Date Due    Pneumococcal 19-64 Medium Risk (1 of 1 - PPSV23) 12/15/1986    DTaP/Tdap/Td series (1 - Tdap) 12/15/1988    PAP AKA CERVICAL CYTOLOGY  12/15/1988 Lab review: labs are reviewed in the EHR    I have discussed the diagnosis with the patient and the intended plan as seen in the above orders. The patient has received an after-visit summary and questions were answered concerning future plans. I have discussed medication side effects and warnings with the patient as well. I have reviewed the plan of care with the patient, accepted their input and they are in agreement with the treatment goals. All questions were answered. The patient understands the plan of care. Handouts provided today with above information. Pt instructed if symptoms worsen to call the office or report to the ED for continued care. Greater than 50% of the visit time was spent in counseling and/or coordination of care. Follow-up Disposition:  Return if symptoms worsen or fail to improve.     Ning Daniel MD

## 2017-11-09 NOTE — TELEPHONE ENCOUNTER
Patient returned my call and informed to contact our [facility]  Melyssa Pedraza at [phone] to schedule for more pictures to be taken of the Left Breast, Dr Praveen Gilbert placed a Mammogram Left Diagnostic Include CAD, and US Breast Left Limited=< 3QUAD, due to the recent Mammogram Study that showed a asymmetric area along the left breast that the Radiologist recommended more views of the left breast. The patient states she understands that we will contact her when those additional picture/results come in

## 2017-11-12 PROBLEM — R79.89 ABNORMAL TSH: Status: ACTIVE | Noted: 2017-11-12

## 2017-11-20 DIAGNOSIS — F41.9 ANXIETY AND DEPRESSION: ICD-10-CM

## 2017-11-20 DIAGNOSIS — Z78.0 MENOPAUSE: ICD-10-CM

## 2017-11-20 DIAGNOSIS — F32.A ANXIETY AND DEPRESSION: ICD-10-CM

## 2017-11-20 DIAGNOSIS — I10 ESSENTIAL HYPERTENSION: ICD-10-CM

## 2017-11-20 DIAGNOSIS — R23.2 HOT FLASHES: ICD-10-CM

## 2017-11-20 RX ORDER — CITALOPRAM 20 MG/1
20 TABLET, FILM COATED ORAL DAILY
Qty: 90 TAB | Refills: 1 | Status: SHIPPED | OUTPATIENT
Start: 2017-11-20

## 2017-11-20 RX ORDER — AMLODIPINE BESYLATE 5 MG/1
5 TABLET ORAL DAILY
Qty: 90 TAB | Refills: 1 | Status: SHIPPED | OUTPATIENT
Start: 2017-11-20

## 2017-11-20 NOTE — TELEPHONE ENCOUNTER
Insurance requires a minimum fill for 90 days. If appropriate, please sign pended medication order. If not, please notify me. Last Visit: 11/09/2017 with MD Mak Holder    Next Appointment: 11/27/2017 with MD Mak Holder     Requested Prescriptions     Pending Prescriptions Disp Refills    amLODIPine (NORVASC) 5 mg tablet 90 Tab 1     Sig: Take 1 Tab by mouth daily.  citalopram (CELEXA) 20 mg tablet 90 Tab 1     Sig: Take 1 Tab by mouth daily.

## 2018-01-18 ENCOUNTER — TELEPHONE (OUTPATIENT)
Dept: INTERNAL MEDICINE CLINIC | Age: 53
End: 2018-01-18

## 2018-01-18 NOTE — TELEPHONE ENCOUNTER
The Radiology team has been unable to contact this pt and are asking us to assist. Please contact the pt and let her know that she is overdue to have a f/u mammogram She had one done but the Radiologist recommended a diagnostic left mammogram to f/u on findings (asymmetry) noted on the original study.  Please instruct her to call [phone] to schedule her diagnostic mammogram.    Dr. Evelyn Cortez  [facility] of [address].  Phone: [phone]  Fax: [phone]

## 2018-06-08 ENCOUNTER — DOCUMENTATION ONLY (OUTPATIENT)
Dept: INTERNAL MEDICINE CLINIC | Age: 53
End: 2018-06-08

## 2022-03-18 PROBLEM — F17.200 NICOTINE DEPENDENCE: Status: ACTIVE | Noted: 2017-10-29

## 2022-03-19 PROBLEM — F32.A ANXIETY AND DEPRESSION: Status: ACTIVE | Noted: 2017-10-18

## 2022-03-19 PROBLEM — K21.9 GASTROESOPHAGEAL REFLUX DISEASE WITHOUT ESOPHAGITIS: Status: ACTIVE | Noted: 2017-10-29

## 2022-03-19 PROBLEM — F41.9 ANXIETY AND DEPRESSION: Status: ACTIVE | Noted: 2017-10-18

## 2022-03-19 PROBLEM — Z78.0 MENOPAUSE: Status: ACTIVE | Noted: 2017-10-18

## 2022-03-19 PROBLEM — R13.10 DYSPHAGIA: Status: ACTIVE | Noted: 2017-10-29

## 2022-03-20 PROBLEM — M65.332 TRIGGER MIDDLE FINGER OF LEFT HAND: Status: ACTIVE | Noted: 2017-10-29

## 2022-03-20 PROBLEM — R79.89 ABNORMAL TSH: Status: ACTIVE | Noted: 2017-11-12

## 2022-03-20 PROBLEM — I10 ESSENTIAL HYPERTENSION: Status: ACTIVE | Noted: 2017-10-18

## 2022-07-31 LAB — MAMMOGRAPHY, EXTERNAL: NORMAL

## 2023-05-22 ENCOUNTER — OFFICE VISIT (OUTPATIENT)
Age: 58
End: 2023-05-22
Payer: MEDICARE

## 2023-05-22 VITALS
WEIGHT: 165 LBS | BODY MASS INDEX: 28.17 KG/M2 | DIASTOLIC BLOOD PRESSURE: 101 MMHG | HEART RATE: 82 BPM | TEMPERATURE: 98.2 F | SYSTOLIC BLOOD PRESSURE: 167 MMHG | OXYGEN SATURATION: 96 % | HEIGHT: 64 IN | RESPIRATION RATE: 18 BRPM

## 2023-05-22 DIAGNOSIS — F41.9 ANXIETY: ICD-10-CM

## 2023-05-22 DIAGNOSIS — Z13.228 SCREENING FOR ENDOCRINE, METABOLIC AND IMMUNITY DISORDER: ICD-10-CM

## 2023-05-22 DIAGNOSIS — Z92.29 S/P BOTOX INJECTION: ICD-10-CM

## 2023-05-22 DIAGNOSIS — Z13.0 SCREENING FOR ENDOCRINE, METABOLIC AND IMMUNITY DISORDER: ICD-10-CM

## 2023-05-22 DIAGNOSIS — Z13.29 SCREENING FOR ENDOCRINE, METABOLIC AND IMMUNITY DISORDER: ICD-10-CM

## 2023-05-22 DIAGNOSIS — K58.9 IRRITABLE BOWEL SYNDROME, UNSPECIFIED TYPE: ICD-10-CM

## 2023-05-22 DIAGNOSIS — Z12.11 SCREEN FOR COLON CANCER: Primary | ICD-10-CM

## 2023-05-22 DIAGNOSIS — I10 ESSENTIAL HYPERTENSION: ICD-10-CM

## 2023-05-22 DIAGNOSIS — K21.9 GASTROESOPHAGEAL REFLUX DISEASE, UNSPECIFIED WHETHER ESOPHAGITIS PRESENT: ICD-10-CM

## 2023-05-22 DIAGNOSIS — Z12.31 ENCOUNTER FOR SCREENING MAMMOGRAM FOR MALIGNANT NEOPLASM OF BREAST: ICD-10-CM

## 2023-05-22 DIAGNOSIS — Z12.4 SCREENING FOR CERVICAL CANCER: ICD-10-CM

## 2023-05-22 PROCEDURE — 3077F SYST BP >= 140 MM HG: CPT | Performed by: INTERNAL MEDICINE

## 2023-05-22 PROCEDURE — 3080F DIAST BP >= 90 MM HG: CPT | Performed by: INTERNAL MEDICINE

## 2023-05-22 PROCEDURE — 99211 OFF/OP EST MAY X REQ PHY/QHP: CPT | Performed by: INTERNAL MEDICINE

## 2023-05-22 PROCEDURE — 99204 OFFICE O/P NEW MOD 45 MIN: CPT | Performed by: INTERNAL MEDICINE

## 2023-05-22 RX ORDER — LOSARTAN POTASSIUM AND HYDROCHLOROTHIAZIDE 12.5; 5 MG/1; MG/1
1 TABLET ORAL DAILY
Qty: 90 TABLET | Refills: 1 | Status: SHIPPED | OUTPATIENT
Start: 2023-05-22

## 2023-05-22 RX ORDER — LOSARTAN POTASSIUM 50 MG/1
50 TABLET ORAL DAILY
COMMUNITY
End: 2023-05-22

## 2023-05-22 SDOH — ECONOMIC STABILITY: FOOD INSECURITY: WITHIN THE PAST 12 MONTHS, YOU WORRIED THAT YOUR FOOD WOULD RUN OUT BEFORE YOU GOT MONEY TO BUY MORE.: NEVER TRUE

## 2023-05-22 SDOH — ECONOMIC STABILITY: HOUSING INSECURITY
IN THE LAST 12 MONTHS, WAS THERE A TIME WHEN YOU DID NOT HAVE A STEADY PLACE TO SLEEP OR SLEPT IN A SHELTER (INCLUDING NOW)?: NO

## 2023-05-22 SDOH — ECONOMIC STABILITY: INCOME INSECURITY: HOW HARD IS IT FOR YOU TO PAY FOR THE VERY BASICS LIKE FOOD, HOUSING, MEDICAL CARE, AND HEATING?: NOT HARD AT ALL

## 2023-05-22 SDOH — ECONOMIC STABILITY: FOOD INSECURITY: WITHIN THE PAST 12 MONTHS, THE FOOD YOU BOUGHT JUST DIDN'T LAST AND YOU DIDN'T HAVE MONEY TO GET MORE.: NEVER TRUE

## 2023-05-22 ASSESSMENT — ENCOUNTER SYMPTOMS
RESPIRATORY NEGATIVE: 1
ALLERGIC/IMMUNOLOGIC NEGATIVE: 1
GASTROINTESTINAL NEGATIVE: 1
EYES NEGATIVE: 1

## 2023-05-22 ASSESSMENT — PATIENT HEALTH QUESTIONNAIRE - PHQ9
SUM OF ALL RESPONSES TO PHQ QUESTIONS 1-9: 0
SUM OF ALL RESPONSES TO PHQ QUESTIONS 1-9: 0
SUM OF ALL RESPONSES TO PHQ9 QUESTIONS 1 & 2: 0
SUM OF ALL RESPONSES TO PHQ QUESTIONS 1-9: 0
SUM OF ALL RESPONSES TO PHQ QUESTIONS 1-9: 0
2. FEELING DOWN, DEPRESSED OR HOPELESS: 0
1. LITTLE INTEREST OR PLEASURE IN DOING THINGS: 0

## 2023-05-22 NOTE — PROGRESS NOTES
Dm Woo presents today for   Chief Complaint   Patient presents with    New Patient                 1. \"Have you been to the ER, urgent care clinic since your last visit? Hospitalized since your last visit? \" no    2. \"Have you seen or consulted any other health care providers outside of the [address] [place] [address] since your last visit? \" no     3. For patients aged 39-70: Has the patient had a colonoscopy / FIT/ Cologuard? Yes - no Care Gap present      If the patient is female:    4. For patients aged 41-77: Has the patient had a mammogram within the past 2 years? Yes - no Care Gap present      5. For patients aged 21-65: Has the patient had a pap smear?  Yes - no Care Gap present

## 2023-05-22 NOTE — PROGRESS NOTES
Subjective:   Jayson Sagastume was seen today for New Patient    Patient has history of hypertension for about 30 years. Patient takes losartan 50 mg every day. Patient had not been compliant with medication. Patient has history of IBS since 2018 requesting GI referral to get it evaluated. Patient states that she sometimes has diarrhea and sometimes constipation associated with bloating. Patient also has history of anxiety for last 30 years. Patient is on Celexa 40 mg once a day. Patient smokes about 3 cigarettes a day takes alcohol occasionally no drugs. Patient has not had colonoscopy/Pap smear/mammogram done in a long time. Patient is a disabled , does not exercise much.         Past Medical History:   Diagnosis Date    Acid reflux     Anxiety     Depression     chroniuc anxiety'    Ectopic pregnancy     Foul smelling urine     Frequency of urination     GERD (gastroesophageal reflux disease)     HTN (hypertension)     Hypertension     IBS (irritable bowel syndrome)        Past Surgical History:   Procedure Laterality Date    GYN      [facility]GA-Removal of fallopian tube     HYSTERECTOMY, VAGINAL      OTHER SURGICAL HISTORY      ectopic       Family History   Problem Relation Age of Onset    Diabetes Mother     Hypertension Mother     Heart Disease Mother     Kidney Disease Mother     Heart Attack Father     Stroke Father     Gout Father        Social History     Socioeconomic History    Marital status: Unknown     Spouse name: Not on file    Number of children: Not on file    Years of education: Not on file    Highest education level: Not on file   Occupational History    Not on file   Tobacco Use    Smoking status: Light Smoker    Smokeless tobacco: Never   Vaping Use    Vaping Use: Never used   Substance and Sexual Activity    Alcohol use: Yes    Drug use: No    Sexual activity: Yes     Partners: Male   Other Topics Concern    Not on file   Social History Narrative    ** Merged History

## 2023-06-21 PROBLEM — Z12.11 SCREEN FOR COLON CANCER: Status: RESOLVED | Noted: 2023-05-22 | Resolved: 2023-06-21

## 2023-06-21 PROBLEM — Z13.29 SCREENING FOR ENDOCRINE, METABOLIC AND IMMUNITY DISORDER: Status: RESOLVED | Noted: 2023-05-22 | Resolved: 2023-06-21

## 2023-06-21 PROBLEM — Z13.0 SCREENING FOR ENDOCRINE, METABOLIC AND IMMUNITY DISORDER: Status: RESOLVED | Noted: 2023-05-22 | Resolved: 2023-06-21

## 2023-06-21 PROBLEM — Z12.31 ENCOUNTER FOR SCREENING MAMMOGRAM FOR MALIGNANT NEOPLASM OF BREAST: Status: RESOLVED | Noted: 2023-05-22 | Resolved: 2023-06-21

## 2023-06-21 PROBLEM — Z92.29 S/P BOTOX INJECTION: Status: RESOLVED | Noted: 2023-05-22 | Resolved: 2023-06-21

## 2023-06-21 PROBLEM — Z13.228 SCREENING FOR ENDOCRINE, METABOLIC AND IMMUNITY DISORDER: Status: RESOLVED | Noted: 2023-05-22 | Resolved: 2023-06-21

## 2023-06-21 PROBLEM — Z12.4 SCREENING FOR CERVICAL CANCER: Status: RESOLVED | Noted: 2023-05-22 | Resolved: 2023-06-21

## 2023-06-23 ENCOUNTER — TELEPHONE (OUTPATIENT)
Age: 58
End: 2023-06-23

## 2023-12-04 ENCOUNTER — COMMUNITY OUTREACH (OUTPATIENT)
Age: 58
End: 2023-12-04

## 2024-02-06 ENCOUNTER — OFFICE VISIT (OUTPATIENT)
Age: 59
End: 2024-02-06
Payer: MEDICARE

## 2024-02-06 VITALS
TEMPERATURE: 98.1 F | OXYGEN SATURATION: 98 % | SYSTOLIC BLOOD PRESSURE: 193 MMHG | HEART RATE: 78 BPM | HEIGHT: 64 IN | BODY MASS INDEX: 27.83 KG/M2 | DIASTOLIC BLOOD PRESSURE: 101 MMHG | WEIGHT: 163 LBS

## 2024-02-06 DIAGNOSIS — F41.9 ANXIETY: ICD-10-CM

## 2024-02-06 DIAGNOSIS — K21.9 GASTROESOPHAGEAL REFLUX DISEASE WITHOUT ESOPHAGITIS: Primary | ICD-10-CM

## 2024-02-06 DIAGNOSIS — Z13.228 SCREENING FOR ENDOCRINE, METABOLIC AND IMMUNITY DISORDER: ICD-10-CM

## 2024-02-06 DIAGNOSIS — Z13.29 SCREENING FOR ENDOCRINE, METABOLIC AND IMMUNITY DISORDER: ICD-10-CM

## 2024-02-06 DIAGNOSIS — Z12.11 SCREEN FOR COLON CANCER: ICD-10-CM

## 2024-02-06 DIAGNOSIS — Z13.0 SCREENING FOR ENDOCRINE, METABOLIC AND IMMUNITY DISORDER: ICD-10-CM

## 2024-02-06 DIAGNOSIS — Z12.31 ENCOUNTER FOR SCREENING MAMMOGRAM FOR MALIGNANT NEOPLASM OF BREAST: ICD-10-CM

## 2024-02-06 DIAGNOSIS — I10 ESSENTIAL HYPERTENSION: ICD-10-CM

## 2024-02-06 PROCEDURE — 3077F SYST BP >= 140 MM HG: CPT | Performed by: INTERNAL MEDICINE

## 2024-02-06 PROCEDURE — 99214 OFFICE O/P EST MOD 30 MIN: CPT | Performed by: INTERNAL MEDICINE

## 2024-02-06 PROCEDURE — 99211 OFF/OP EST MAY X REQ PHY/QHP: CPT | Performed by: INTERNAL MEDICINE

## 2024-02-06 PROCEDURE — 3080F DIAST BP >= 90 MM HG: CPT | Performed by: INTERNAL MEDICINE

## 2024-02-06 RX ORDER — LOSARTAN POTASSIUM 100 MG/1
100 TABLET ORAL DAILY
Qty: 90 TABLET | Refills: 0 | Status: SHIPPED | OUTPATIENT
Start: 2024-02-06

## 2024-02-06 ASSESSMENT — PATIENT HEALTH QUESTIONNAIRE - PHQ9
10. IF YOU CHECKED OFF ANY PROBLEMS, HOW DIFFICULT HAVE THESE PROBLEMS MADE IT FOR YOU TO DO YOUR WORK, TAKE CARE OF THINGS AT HOME, OR GET ALONG WITH OTHER PEOPLE: 0
SUM OF ALL RESPONSES TO PHQ9 QUESTIONS 1 & 2: 0
1. LITTLE INTEREST OR PLEASURE IN DOING THINGS: 0
6. FEELING BAD ABOUT YOURSELF - OR THAT YOU ARE A FAILURE OR HAVE LET YOURSELF OR YOUR FAMILY DOWN: 0
SUM OF ALL RESPONSES TO PHQ QUESTIONS 1-9: 0
7. TROUBLE CONCENTRATING ON THINGS, SUCH AS READING THE NEWSPAPER OR WATCHING TELEVISION: 0
SUM OF ALL RESPONSES TO PHQ QUESTIONS 1-9: 0
4. FEELING TIRED OR HAVING LITTLE ENERGY: 0
9. THOUGHTS THAT YOU WOULD BE BETTER OFF DEAD, OR OF HURTING YOURSELF: 0
2. FEELING DOWN, DEPRESSED OR HOPELESS: 0
SUM OF ALL RESPONSES TO PHQ QUESTIONS 1-9: 0
3. TROUBLE FALLING OR STAYING ASLEEP: 0
8. MOVING OR SPEAKING SO SLOWLY THAT OTHER PEOPLE COULD HAVE NOTICED. OR THE OPPOSITE, BEING SO FIGETY OR RESTLESS THAT YOU HAVE BEEN MOVING AROUND A LOT MORE THAN USUAL: 0
SUM OF ALL RESPONSES TO PHQ QUESTIONS 1-9: 0
5. POOR APPETITE OR OVEREATING: 0

## 2024-02-06 ASSESSMENT — ENCOUNTER SYMPTOMS
RESPIRATORY NEGATIVE: 1
GASTROINTESTINAL NEGATIVE: 1
EYES NEGATIVE: 1
ALLERGIC/IMMUNOLOGIC NEGATIVE: 1

## 2024-02-06 NOTE — PROGRESS NOTES
Pili Samaniego (: 1965) is a 58 y.o. female, here for evaluation of the following chief complaint(s):  Follow-up       ASSESSMENT/PLAN:  Below is the assessment and plan developed based on review of pertinent history, physical exam, labs, studies, and medications.    1. Gastroesophageal reflux disease without esophagitis  Assessment & Plan:   Stable on Prilosec  2. Essential hypertension  Assessment & Plan:   To stop losartan hydrochlorothiazide.  To start losartan 100 mg every day.  Patient will be keeping a log of blood pressure at home and bring it next office visit.  Orders:  -     losartan (COZAAR) 100 MG tablet; Take 1 tablet by mouth daily, Disp-90 tablet, R-0Normal  3. Encounter for screening mammogram for malignant neoplasm of breast  -     [facility] CRISTINA DIGITAL SCREEN BILATERAL; Future  4. Screening for endocrine, metabolic and immunity disorder  -     CBC with Auto Differential; Future  -     Comprehensive Metabolic Panel; Future  -     Hemoglobin A1C; Future  -     Lipid Panel; Future  -     ABO/Rh; Future  5. Screen for colon cancer  6. Anxiety  Assessment & Plan:   Stable on Celexa.  Follows with behavioral health      Return for LABS TODAY, follow up on chronic conditions.     SUBJECTIVE/OBJECTIVE:  HPI  Patient does not like increased urination with her antihypertensives.  No chest pain or palpitations or shortness of breath.  No new complaints or concerns.  Patient is requesting to change her antihypertensive medication.    Patient informed that she has yet to make appointment with GI for colonoscopy.    Her anxiety is under control with Celexa.  No suicidal ideation.    Her acid reflux is not controlled with Prilosec.    Reviewed previous labs with patient.  Labs from  has shown hemoglobin 10.4, hematocrit 32.6.  HbA1c 5.7.  Lipid profile within acceptable range.  Liver function, kidney function within acceptable range.    Review of Systems   Constitutional: Negative.  Negative for  911 or go to the nearest Emergency Room

## 2024-02-06 NOTE — PROGRESS NOTES
Pili Samaniego presents today for   Chief Complaint   Patient presents with    Follow-up                 1. \"Have you been to the ER, urgent care clinic since your last visit?  Hospitalized since your last visit?\" no    2. \"Have you seen or consulted any other health care providers outside of the [facility] since your last visit?\" no     3. For patients aged 45-75: Has the patient had a colonoscopy / FIT/ Cologuard? Yes - no Care Gap present      If the patient is female:    4. For patients aged 40-74: Has the patient had a mammogram within the past 2 years? No      5. For patients aged 21-65: Has the patient had a pap smear? Yes - no Care Gap present

## 2024-02-06 NOTE — ASSESSMENT & PLAN NOTE
To stop losartan hydrochlorothiazide.  To start losartan 100 mg every day.  Patient will be keeping a log of blood pressure at home and bring it next office visit.

## 2024-02-07 ENCOUNTER — HOSPITAL ENCOUNTER (OUTPATIENT)
Facility: HOSPITAL | Age: 59
Discharge: HOME | End: 2024-02-10

## 2024-02-07 LAB — LABCORP SPECIMEN COLLECTION: NORMAL

## 2024-02-08 LAB
ABO GROUP BLD: NORMAL
ALBUMIN SERPL-MCNC: 4.8 G/DL (ref 3.8–4.9)
ALBUMIN/GLOB SERPL: 2.2 {RATIO} (ref 1.2–2.2)
ALP SERPL-CCNC: 82 IU/L (ref 44–121)
ALT SERPL-CCNC: 12 IU/L (ref 0–32)
AST SERPL-CCNC: 13 IU/L (ref 0–40)
BASOPHILS # BLD AUTO: 0 X10E3/UL (ref 0–0.2)
BASOPHILS NFR BLD AUTO: 0 %
BILIRUB SERPL-MCNC: 0.3 MG/DL (ref 0–1.2)
BUN SERPL-MCNC: 11 MG/DL (ref 6–24)
BUN/CREAT SERPL: 13 (ref 9–23)
CALCIUM SERPL-MCNC: 10.5 MG/DL (ref 8.7–10.2)
CHLORIDE SERPL-SCNC: 100 MMOL/L (ref 96–106)
CHOLEST SERPL-MCNC: 223 MG/DL (ref 100–199)
CO2 SERPL-SCNC: 27 MMOL/L (ref 20–29)
CREAT SERPL-MCNC: 0.83 MG/DL (ref 0.57–1)
EGFRCR SERPLBLD CKD-EPI 2021: 83 ML/MIN/1.73
EOSINOPHIL # BLD AUTO: 0.1 X10E3/UL (ref 0–0.4)
EOSINOPHIL NFR BLD AUTO: 2 %
ERYTHROCYTE [DISTWIDTH] IN BLOOD BY AUTOMATED COUNT: 11.6 % (ref 11.7–15.4)
GLOBULIN SER CALC-MCNC: 2.2 G/DL (ref 1.5–4.5)
GLUCOSE SERPL-MCNC: 89 MG/DL (ref 70–99)
HBA1C MFR BLD: 5.6 % (ref 4.8–5.6)
HCT VFR BLD AUTO: 36.8 % (ref 34–46.6)
HDLC SERPL-MCNC: 116 MG/DL
HGB BLD-MCNC: 12.6 G/DL (ref 11.1–15.9)
IMM GRANULOCYTES # BLD AUTO: 0 X10E3/UL (ref 0–0.1)
IMM GRANULOCYTES NFR BLD AUTO: 0 %
LDLC SERPL CALC-MCNC: 91 MG/DL (ref 0–99)
LYMPHOCYTES # BLD AUTO: 2.4 X10E3/UL (ref 0.7–3.1)
LYMPHOCYTES NFR BLD AUTO: 48 %
MCH RBC QN AUTO: 29.3 PG (ref 26.6–33)
MCHC RBC AUTO-ENTMCNC: 34.2 G/DL (ref 31.5–35.7)
MCV RBC AUTO: 86 FL (ref 79–97)
MONOCYTES # BLD AUTO: 0.2 X10E3/UL (ref 0.1–0.9)
MONOCYTES NFR BLD AUTO: 5 %
NEUTROPHILS # BLD AUTO: 2.2 X10E3/UL (ref 1.4–7)
NEUTROPHILS NFR BLD AUTO: 45 %
PLATELET # BLD AUTO: 296 X10E3/UL (ref 150–450)
POTASSIUM SERPL-SCNC: 4.5 MMOL/L (ref 3.5–5.2)
PROT SERPL-MCNC: 7 G/DL (ref 6–8.5)
RBC # BLD AUTO: 4.3 X10E6/UL (ref 3.77–5.28)
RH BLD: POSITIVE
SODIUM SERPL-SCNC: 142 MMOL/L (ref 134–144)
TRIGL SERPL-MCNC: 93 MG/DL (ref 0–149)
VLDLC SERPL CALC-MCNC: 16 MG/DL (ref 5–40)
WBC # BLD AUTO: 4.9 X10E3/UL (ref 3.4–10.8)

## 2024-02-22 ENCOUNTER — TELEPHONE (OUTPATIENT)
Age: 59
End: 2024-02-22

## 2024-02-22 NOTE — TELEPHONE ENCOUNTER
Patient is req for provider to call her back due to the losartan that was prescribed to her. She would not disclose any information she says she will wait to speak to the provider      [phone]

## 2024-03-07 PROBLEM — Z12.11 SCREEN FOR COLON CANCER: Status: RESOLVED | Noted: 2023-05-22 | Resolved: 2024-03-07

## 2024-05-05 DIAGNOSIS — I10 ESSENTIAL HYPERTENSION: ICD-10-CM

## 2024-05-05 RX ORDER — LOSARTAN POTASSIUM 100 MG/1
100 TABLET ORAL DAILY
Qty: 90 TABLET | Refills: 0 | OUTPATIENT
Start: 2024-05-05

## 2024-06-11 DIAGNOSIS — I10 ESSENTIAL HYPERTENSION: ICD-10-CM

## 2024-06-11 RX ORDER — LOSARTAN POTASSIUM 100 MG/1
100 TABLET ORAL DAILY
Qty: 90 TABLET | Refills: 0 | OUTPATIENT
Start: 2024-06-11